# Patient Record
Sex: FEMALE | Race: WHITE | Employment: FULL TIME | URBAN - METROPOLITAN AREA
[De-identification: names, ages, dates, MRNs, and addresses within clinical notes are randomized per-mention and may not be internally consistent; named-entity substitution may affect disease eponyms.]

---

## 2019-04-09 ENCOUNTER — OFFICE VISIT (OUTPATIENT)
Dept: FAMILY MEDICINE CLINIC | Age: 19
End: 2019-04-09

## 2019-04-09 VITALS
HEART RATE: 79 BPM | DIASTOLIC BLOOD PRESSURE: 77 MMHG | BODY MASS INDEX: 22.86 KG/M2 | RESPIRATION RATE: 18 BRPM | OXYGEN SATURATION: 98 % | HEIGHT: 63 IN | WEIGHT: 129 LBS | TEMPERATURE: 98.6 F | SYSTOLIC BLOOD PRESSURE: 116 MMHG

## 2019-04-09 DIAGNOSIS — N93.9 ABNORMAL UTERINE BLEEDING (AUB): Primary | ICD-10-CM

## 2019-04-09 LAB
HCG URINE, QL. (POC): NEGATIVE
VALID INTERNAL CONTROL?: YES

## 2019-04-09 NOTE — PATIENT INSTRUCTIONS
Missed pill instructions for oral contraceptives:  - Missed one pill: either take the pill right as you remember if it is on the same day or take two pills the next day  - Missed two pills: take two pills on the day you remember and take two pills the next day; then take one pill a day until you finish the pack; use protection for the next 7 days if you have intercourse to decrease the risk of pregnancy  - Missed three pills: throw that pill pack away and either wait for withdrawal bleed or start a new pack; use protection for the next 7 days if you have intercourse to decrease the risk of pregnancy    There are some minor side effects you may experience when starting your birth control. These include: weight gain, lighter periods, mood changes, nausea, and sore or swollen breasts. Remember the pneumonic ACHES which identifies some of the less common but more serious side effects of birth control. A - Abdominal pain  C - Chest pain  H - Headache  E - Eye problems (blurred vision)  S - Swelling and or pain in the leg  If you experience any of these side effects after starting your birth control, please call your provider ASAP. Combination Birth Control Pills: Care Instructions  Your Care Instructions    Combination birth control pills are used to prevent pregnancy. They give you a regular dose of the hormones estrogen and progestin. You take a hormone pill every day to prevent pregnancy. Birth control pills come in packs. The most common type has 3 weeks of hormone pills. Some packs have sugar pills (they do not contain any hormones) for the fourth week. During that fourth no-hormone week, you have your period. After the fourth week (28 days), you start a new pack. Some birth control pills are packaged in different ways. For example, some have hormone pills for the fourth week instead of sugar pills. Taking hormones for the entire month causes you to not have periods or to have fewer periods.  Others are packaged so that you have a period every 3 months. Your doctor will tell you what type of pills you have. Follow-up care is a key part of your treatment and safety. Be sure to make and go to all appointments, and call your doctor if you are having problems. It's also a good idea to know your test results and keep a list of the medicines you take. How can you care for yourself at home? How do you take the pill? · Follow your doctor's instructions about when to start taking your pills. Use backup birth control, such as a condom, or don't have intercourse for 7 days after you start your pills. · Take your pills every day, at about the same time of day. To help yourself do this, try to take them when you do something else every day, such as brushing your teeth. What if you forget to take a pill? Always read the label for specific instructions, or call your doctor. Here are some basic guidelines:  · If you miss 1 hormone pill, take it as soon as you remember. Ask your doctor if you may need to use a backup birth control method, such as a condom, or not have intercourse. · If you miss 2 or more hormone pills, take one as soon as you remember you forgot them. Then read the pill label or call your doctor about instructions on how to take your missed pills. Use a backup method of birth control or don't have intercourse for 7 days. Pregnancy is more likely if you miss more than 1 pill. · If you had intercourse, you can use emergency contraception, such as the morning-after pill (Plan B). You can use emergency contraception for up to 5 days after having had intercourse, but it works best if you take it right away. What else do you need to know? · The pill has side effects. ? You may have very light or skipped periods. ? You may have bleeding between periods (spotting). This usually decreases after 3 to 4 months. ? You may have mood changes, less interest in sex, or weight gain.   · The pill may reduce acne, heavy bleeding and cramping, and symptoms of premenstrual syndrome. · Check with your doctor before you use any other medicines, including over-the-counter medicines, vitamins, herbal products, and supplements. Birth control hormones may not work as well to prevent pregnancy when combined with other medicines. · The pill doesn't protect against sexually transmitted infection (STIs), such as herpes or HIV/AIDS. If you're not sure whether your sex partner might have an STI, use a condom to protect against disease. When should you call for help? Call your doctor now or seek immediate medical care if:    · You have severe belly pain.     · You have signs of a blood clot, such as:  ? Pain in your calf, back of the knee, thigh, or groin. ? Redness and swelling in your leg or groin.     · You have blurred vision or other problems seeing.     · You have a severe headache.     · You have severe trouble breathing.    Watch closely for changes in your health, and be sure to contact your doctor if:    · You think you might be pregnant.     · You think you may be depressed.     · You think you may have been exposed to or have a sexually transmitted infection. Where can you learn more? Go to http://rossy-valente.info/. Enter T578 in the search box to learn more about \"Combination Birth Control Pills: Care Instructions. \"  Current as of: September 5, 2018  Content Version: 11.9  © 2846-0548 [employer]. Care instructions adapted under license by [employer] (which disclaims liability or warranty for this information). If you have questions about a medical condition or this instruction, always ask your healthcare professional. [place] [address] any warranty or liability for your use of this information.

## 2019-04-09 NOTE — PROGRESS NOTES
Chief Complaint   Patient presents with   [address] Contraception     Patient in office today to [facility]. Pt would like to discuss starting contraceptive, pt has never been on ocp. Pt would like to discuss ocp.

## 2019-04-09 NOTE — PROGRESS NOTES
Chief Complaint   Patient presents with   [address] Contraception     Patient in office today to [facility]. Was previously a patient at [facility]. Beryle Levee. Currently working and taking online classes. Starting [employer] in the Fall. Wants to study theater. Pt would like to discuss starting contraceptive, pt has never been on ocp. Pt would like to discuss ocp. LMP: started on Saturday  Last intercourse: Friday  Any possibility of STI? Denies. Vaginal or urinary sx: Denies. Has patient previously tried [facility] before: Denies. Current smoker? Denies. Any history of blood clots in legs or lungs? Denies. Any family history of blood clots? Denies. History of migraine HAs? Denies. Usually the first 2 days of her cycle are heavy and painful. Was also having some irregular periods. Chief Complaint   Patient presents with   [address] Contraception     she is a 22y.o. year old female who presents for evalution. Reviewed PmHx, RxHx, FmHx, SocHx, AllgHx and updated and dated in the chart.     Review of Systems - negative except as listed above in the HPI    Objective:     Vitals:    04/09/19 0950   BP: 116/77   Pulse: 79   Resp: 18   Temp: 98.6 °F (37 °C)   TempSrc: Oral   SpO2: 98%   Weight: 129 lb (58.5 kg)   Height: 5' 3\" (1.6 m)     Physical Examination: General appearance - alert, well appearing, and in no distress  Mental status - normal mood, behavior, speech, dress, motor activity, and thought processes  Eyes - pupils equal and reactive, extraocular eye movements intact  Ears - bilateral TM's and external ear canals normal  Nose - normal and patent, no erythema, discharge or polyps  Mouth - mucous membranes moist, pharynx normal without lesions  Neck - supple, no significant adenopathy, thyroid exam: thyroid is normal in size without nodules or tenderness  Chest - clear to auscultation, no wheezes, rales or rhonchi, symmetric air entry  Heart - normal rate, regular rhythm, normal S1, S2, no murmurs\  Extremities - peripheral pulses normal, no edema, no clubbing or cyanosis  Skin - normal coloration and turgor, no rashes, no suspicious skin lesions noted    Assessment/ Plan:   Diagnoses and all orders for this visit:    1. Abnormal uterine bleeding (AUB)  -     AMB POC URINE PREGNANCY TEST, VISUAL COLOR COMPARISON  -     norethindrone-e estradiol-iron (LOESTRIN FE) 1 mg-20 mcg (24)/75 mg (4) tab; Take 1 Tab by mouth daily. Start loestrin daily. Reviewed SEs/ADRs of medication. Reviewed missed pill instructions and ACHES. Follow up if sx persist or worsen or if medication SEs intolerable. Follow-up and Dispositions    · Return if symptoms worsen or fail to improve. I have discussed the diagnosis with the patient and the intended plan as seen in the above orders. The patient has received an after-visit summary and questions were answered concerning future plans. Medication Side Effects and Warnings were discussed with patient: yes  Patient Labs were reviewed and or requested: yes  Patient Past Records were reviewed and or requested  yes  Patient / Caregiver Understanding of treatment plan was verbalized during office visit YES    KIRBY Duran    Patient Instructions     Missed pill instructions for oral contraceptives:  - Missed one pill: either take the pill right as you remember if it is on the same day or take two pills the next day  - Missed two pills: take two pills on the day you remember and take two pills the next day; then take one pill a day until you finish the pack; use protection for the next 7 days if you have intercourse to decrease the risk of pregnancy  - Missed three pills: throw that pill pack away and either wait for withdrawal bleed or start a new pack; use protection for the next 7 days if you have intercourse to decrease the risk of pregnancy    There are some minor side effects you may experience when starting your birth control.  These include: weight gain, lighter periods, mood changes, nausea, and sore or swollen breasts. Remember the pneumonic ACHES which identifies some of the less common but more serious side effects of birth control. A - Abdominal pain  C - Chest pain  H - Headache  E - Eye problems (blurred vision)  S - Swelling and or pain in the leg  If you experience any of these side effects after starting your birth control, please call your provider ASAP. Combination Birth Control Pills: Care Instructions  Your Care Instructions    Combination birth control pills are used to prevent pregnancy. They give you a regular dose of the hormones estrogen and progestin. You take a hormone pill every day to prevent pregnancy. Birth control pills come in packs. The most common type has 3 weeks of hormone pills. Some packs have sugar pills (they do not contain any hormones) for the fourth week. During that fourth no-hormone week, you have your period. After the fourth week (28 days), you start a new pack. Some birth control pills are packaged in different ways. For example, some have hormone pills for the fourth week instead of sugar pills. Taking hormones for the entire month causes you to not have periods or to have fewer periods. Others are packaged so that you have a period every 3 months. Your doctor will tell you what type of pills you have. Follow-up care is a key part of your treatment and safety. Be sure to make and go to all appointments, and call your doctor if you are having problems. It's also a good idea to know your test results and keep a list of the medicines you take. How can you care for yourself at home? How do you take the pill? · Follow your doctor's instructions about when to start taking your pills. Use backup birth control, such as a condom, or don't have intercourse for 7 days after you start your pills. · Take your pills every day, at about the same time of day.  To help yourself do this, try to take them when you do something else every day, such as brushing your teeth. What if you forget to take a pill? Always read the label for specific instructions, or call your doctor. Here are some basic guidelines:  · If you miss 1 hormone pill, take it as soon as you remember. Ask your doctor if you may need to use a backup birth control method, such as a condom, or not have intercourse. · If you miss 2 or more hormone pills, take one as soon as you remember you forgot them. Then read the pill label or call your doctor about instructions on how to take your missed pills. Use a backup method of birth control or don't have intercourse for 7 days. Pregnancy is more likely if you miss more than 1 pill. · If you had intercourse, you can use emergency contraception, such as the morning-after pill (Plan B). You can use emergency contraception for up to 5 days after having had intercourse, but it works best if you take it right away. What else do you need to know? · The pill has side effects. ? You may have very light or skipped periods. ? You may have bleeding between periods (spotting). This usually decreases after 3 to 4 months. ? You may have mood changes, less interest in sex, or weight gain. · The pill may reduce acne, heavy bleeding and cramping, and symptoms of premenstrual syndrome. · Check with your doctor before you use any other medicines, including over-the-counter medicines, vitamins, herbal products, and supplements. Birth control hormones may not work as well to prevent pregnancy when combined with other medicines. · The pill doesn't protect against sexually transmitted infection (STIs), such as herpes or HIV/AIDS. If you're not sure whether your sex partner might have an STI, use a condom to protect against disease. When should you call for help?   Call your doctor now or seek immediate medical care if:    · You have severe belly pain.     · You have signs of a blood clot, such as:  ? Pain in your calf, back of the knee, thigh, or groin. ? Redness and swelling in your leg or groin.     · You have blurred vision or other problems seeing.     · You have a severe headache.     · You have severe trouble breathing.    Watch closely for changes in your health, and be sure to contact your doctor if:    · You think you might be pregnant.     · You think you may be depressed.     · You think you may have been exposed to or have a sexually transmitted infection. Where can you learn more? Go to http://rossy-valente.info/. Enter J920 in the search box to learn more about \"Combination Birth Control Pills: Care Instructions. \"  Current as of: September 5, 2018  Content Version: 11.9  © 3861-4495 [employer]. Care instructions adapted under license by [employer] (which disclaims liability or warranty for this information). If you have questions about a medical condition or this instruction, always ask your healthcare professional. [address] any warranty or liability for your use of this information.

## 2019-10-11 ENCOUNTER — TELEPHONE (OUTPATIENT)
Dept: FAMILY MEDICINE CLINIC | Age: 19
End: 2019-10-11

## 2019-10-11 ENCOUNTER — OFFICE VISIT (OUTPATIENT)
Dept: FAMILY MEDICINE CLINIC | Age: 19
End: 2019-10-11

## 2019-10-11 VITALS
WEIGHT: 137 LBS | SYSTOLIC BLOOD PRESSURE: 122 MMHG | HEART RATE: 87 BPM | HEIGHT: 63 IN | RESPIRATION RATE: 18 BRPM | TEMPERATURE: 99.1 F | OXYGEN SATURATION: 99 % | DIASTOLIC BLOOD PRESSURE: 83 MMHG | BODY MASS INDEX: 24.27 KG/M2

## 2019-10-11 DIAGNOSIS — F41.1 GAD (GENERALIZED ANXIETY DISORDER): Primary | ICD-10-CM

## 2019-10-11 DIAGNOSIS — F32.A DEPRESSIVE DISORDER: ICD-10-CM

## 2019-10-11 DIAGNOSIS — F41.1 GAD (GENERALIZED ANXIETY DISORDER): ICD-10-CM

## 2019-10-11 DIAGNOSIS — Z30.013 ENCOUNTER FOR INITIAL PRESCRIPTION OF INJECTABLE CONTRACEPTIVE: ICD-10-CM

## 2019-10-11 LAB
HCG URINE, QL. (POC): NEGATIVE
VALID INTERNAL CONTROL?: YES

## 2019-10-11 RX ORDER — MEDROXYPROGESTERONE ACETATE 150 MG/ML
150 INJECTION, SUSPENSION INTRAMUSCULAR ONCE
Qty: 1 ML | Refills: 3 | Status: SHIPPED | OUTPATIENT
Start: 2019-10-11 | End: 2019-10-11

## 2019-10-11 RX ORDER — VENLAFAXINE HYDROCHLORIDE 75 MG/1
75 CAPSULE, EXTENDED RELEASE ORAL DAILY
Qty: 30 CAP | Refills: 1 | Status: SHIPPED | OUTPATIENT
Start: 2019-10-11 | End: 2019-10-14 | Stop reason: DRUGHIGH

## 2019-10-11 RX ORDER — VENLAFAXINE HYDROCHLORIDE 75 MG/1
75 CAPSULE, EXTENDED RELEASE ORAL DAILY
Qty: 30 CAP | Refills: 1 | Status: SHIPPED | OUTPATIENT
Start: 2019-10-11 | End: 2019-10-11 | Stop reason: SDUPTHER

## 2019-10-11 RX ORDER — VENLAFAXINE HYDROCHLORIDE 37.5 MG/1
37.5 CAPSULE, EXTENDED RELEASE ORAL DAILY
Qty: 7 CAP | Refills: 0 | Status: SHIPPED | OUTPATIENT
Start: 2019-10-11 | End: 2019-10-14 | Stop reason: DRUGHIGH

## 2019-10-11 RX ORDER — VENLAFAXINE HYDROCHLORIDE 37.5 MG/1
37.5 CAPSULE, EXTENDED RELEASE ORAL DAILY
Qty: 7 CAP | Refills: 0 | Status: SHIPPED | OUTPATIENT
Start: 2019-10-11 | End: 2019-10-11 | Stop reason: SDUPTHER

## 2019-10-11 RX ORDER — MEDROXYPROGESTERONE ACETATE 150 MG/ML
150 INJECTION, SUSPENSION INTRAMUSCULAR ONCE
Qty: 1 ML | Refills: 3 | Status: SHIPPED | OUTPATIENT
Start: 2019-10-11 | End: 2019-10-11 | Stop reason: SDUPTHER

## 2019-10-11 NOTE — PATIENT INSTRUCTIONS
Venlafaxine (By mouth)   Venlafaxine ([id number])  Treats depression, generalized anxiety disorder, panic disorder, and social anxiety disorder. Brand Name(s): Effexor XR   There may be other brand names for this medicine. When This Medicine Should Not Be Used: This medicine is not right for everyone. Do not use it if you had an allergic reaction to venlafaxine or desvenlafaxine succinate. How to Use This Medicine:   Long Acting Capsule, Tablet, Long Acting Tablet  · Take your medicine as directed. Your dose may need to be changed several times to find what works best for you. · It is best to take the extended-release capsule at the same time each day (either in the morning or evening). · It is best to take this medicine with food or milk. · Swallow the extended-release capsule whole. Do not crush, break, or chew it. Do not place the capsule in a liquid. · If you cannot swallow the extended-release capsule, you may open it and pour the medicine into a small amount of soft food such as pudding, yogurt, or applesauce. Stir this mixture well and swallow it without chewing. · This medicine should come with a Medication Guide. Ask your pharmacist for a copy if you do not have one. · Missed dose: Take a dose as soon as you remember. If it is almost time for your next dose, wait until then and take a regular dose. Do not take extra medicine to make up for a missed dose. · Store the medicine in a closed container at room temperature, away from heat, moisture, and direct light. Drugs and Foods to Avoid:   Ask your doctor or pharmacist before using any other medicine, including over-the-counter medicines, vitamins, and herbal products. · Do not use this medicine if you have used an MAO inhibitor within the past 14 days. Do not take an MAO inhibitor for at least 7 days after you stop this medicine. · Some medicines can affect how venlafaxine works.  Tell your doctor if you are using any of the following: ¨ Buspirone, cimetidine, fentanyl, ketoconazole, lithium, metoprolol, mirtazapine, Marcos's wort, tramadol, or tryptophan supplements  ¨ Amphetamines  ¨ Blood thinner (including warfarin)  ¨ Diuretic (water pill)  ¨ Medicine for migraine headaches  ¨ Medicine to lose weight (including phentermine)  ¨ NSAID pain or arthritis medicine (including aspirin, celecoxib, diclofenac, ibuprofen, naproxen)  ¨ Tricyclic antidepressant  · Do not drink alcohol while you are using this medicine. · Tell your doctor if you use anything else that makes you sleepy. Some examples are allergy medicine, narcotic pain medicine, and alcohol. Warnings While Using This Medicine:   · Tell your doctor if you are pregnant or breastfeeding, or if you have kidney disease, liver disease, glaucoma, heart disease, high blood pressure, or thyroid problems. Tell your doctor if you have a history of silvia, seizures, heart attack, or stroke. · This medicine can increase thoughts of suicide. Tell your doctor right away if you start to feel depressed and have thoughts about hurting yourself. · This medicine may cause the following problems:   ¨ Serotonin syndrome (when used with certain medicines)  ¨ Increased cholesterol levels  ¨ Increased blood pressure  ¨ Increased risk of bleeding problems  ¨ Low sodium levels  ¨ Interstitial lung disease and eosinophilic pneumonia  · This medicine may make you dizzy or drowsy. Do not drive or do anything that could be dangerous until you know how this medicine affects you. · Do not stop using this medicine suddenly. Your doctor will need to slowly decrease your dose before you stop it completely. · Tell any doctor or dentist who treats you that you are using this medicine. This medicine may affect certain medical test results. · Your doctor will do lab tests at regular visits to check on the effects of this medicine. Keep all appointments. · Keep all medicine out of the reach of children.  Never share your medicine with anyone. Possible Side Effects While Using This Medicine:   Call your doctor right away if you notice any of these side effects:  · Allergic reaction: Itching or hives, swelling in your face or hands, swelling or tingling in your mouth or throat, chest tightness, trouble breathing  · Anxiety, restlessness, fever, sweating, muscle spasms, nausea, vomiting, diarrhea, seeing or hearing things that are not there  · Blistering, peeling, red skin rash  · Chest pain, cough, trouble breathing  · Confusion, weakness, and muscle twitching  · Eye pain, vision changes, seeing halos around lights  · Fast or pounding heartbeat  · Feeling more excited or energetic than usual  · Headache, trouble concentrating, memory problems, unsteadiness  · Seizures  · Unusual behavior, thoughts of hurting yourself or others, trouble sleeping, nervousness, unusual dreams  · Unusual bleeding or bruising  If you notice these less serious side effects, talk with your doctor:   · Dry mouth  · Mild nausea, constipation, vomiting, loss of appetite, weight loss  · Sexual problems  · Sleepiness or unusual drowsiness, dizziness  If you notice other side effects that you think are caused by this medicine, tell your doctor. Call your doctor for medical advice about side effects. You may report side effects to FDA at 9-570-FDA-9442  © 2017 [facility] Information is for End User's use only and may not be sold, redistributed or otherwise used for commercial purposes. The above information is an  only. It is not intended as medical advice for individual conditions or treatments. Talk to your doctor, nurse or pharmacist before following any medical regimen to see if it is safe and effective for you.

## 2019-10-11 NOTE — PROGRESS NOTES
Chief Complaint   Patient presents with    Anxiety    Depo     Patient in office today to discuss anxiety levels that has worsened in the past 2 months. Pt states she has noted n/v and panic attacks. Pt states she noted 2x wk panic attacks that last 15-30mins. Pt used supportive measures to deal with panic attacks. Pt stated she stopped loestrin fe 8 months ago thinking was contributing to anxiety. Pt would like to discuss initiating the depo injection for contraception. 1. Have you been to the ER, urgent care clinic since your last visit? Hospitalized since your last visit? No    2. Have you seen or consulted any other health care providers outside of the [address] [place] [address] since your last visit? Include any pap smears or colon screening.  No

## 2019-10-11 NOTE — PROGRESS NOTES
Chief Complaint   Patient presents with    Anxiety    Depo     Patient in office today to discuss anxiety levels that has worsened in the past 2 months. Pt states she has noted n/v and panic attacks. Pt states she noted 2x wk panic attacks that last 15-30 mins. Pt used supportive measures to deal with panic attacks. Pt stated she stopped loestrin fe 8 months ago thinking was contributing to anxiety. Pt has not previously been on medication for anxiety and depression. Has previously been able to control. Now effecting her relationships and school. 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 Denies any situational stressors or triggers. Frustrated because she feels like she shouldn't feel this way. Has problems sleeping at night. Will wake up 2 times a night. Hast an appt with a counselor. Pt would like to discuss initiating the depo injection for contraception. Chief Complaint   Patient presents with   [address] Depo     she is a 20y.o. year old female who presents for evalution. Reviewed PmHx, RxHx, FmHx, SocHx, AllgHx and updated and dated in the chart. Review of Systems - negative except as listed above in the HPI    Objective:     Vitals:    10/11/19 0905   BP: 122/83   Pulse: 87   Resp: 18   Temp: 99.1 °F (37.3 °C)   TempSrc: Oral   SpO2: 99%   Weight: 137 lb (62.1 kg)   Height: 5' 3\" (1.6 m)     Physical Examination: General appearance - alert, well appearing, and in no distress  Mental status - depressed mood, anxious  Chest - clear to auscultation, no wheezes, rales or rhonchi, symmetric air entry  Heart - normal rate, regular rhythm, normal S1, S2, no murmurs    Assessment/ Plan:   Diagnoses and all orders for this visit:    1. CRIS (generalized anxiety disorder) / 2. Depressive disorder  Start effexor daily. Reviewed SEs/ADRs of medication. Enc pt to follow up if sx persist or worsen or if medication SEs intolerable to discuss alt treatment options.  Keep appt with the counselor for additional assistance. 3. Encounter for initial prescription of injectable contraceptive  -     AMB POC URINE PREGNANCY TEST, VISUAL COLOR COMPARISON  Neg UPT. Depo sent to pharmacy. RTC for injection. Follow-up and Dispositions    · Return in about 4 weeks (around 11/8/2019). I have discussed the diagnosis with the patient and the intended plan as seen in the above orders. The patient has received an after-visit summary and questions were answered concerning future plans. Medication Side Effects and Warnings were discussed with patient: yes  Patient Labs were reviewed and or requested: yes  Patient Past Records were reviewed and or requested  yes  Patient / Caregiver Understanding of treatment plan was verbalized during office visit YES    KIRBY Talbot    Patient Instructions   Venlafaxine (By mouth)   Venlafaxine ([id number])  Treats depression, generalized anxiety disorder, panic disorder, and social anxiety disorder. Brand Name(s): Effexor XR   There may be other brand names for this medicine. When This Medicine Should Not Be Used: This medicine is not right for everyone. Do not use it if you had an allergic reaction to venlafaxine or desvenlafaxine succinate. How to Use This Medicine:   Long Acting Capsule, Tablet, Long Acting Tablet  · Take your medicine as directed. Your dose may need to be changed several times to find what works best for you. · It is best to take the extended-release capsule at the same time each day (either in the morning or evening). · It is best to take this medicine with food or milk. · Swallow the extended-release capsule whole. Do not crush, break, or chew it. Do not place the capsule in a liquid. · If you cannot swallow the extended-release capsule, you may open it and pour the medicine into a small amount of soft food such as pudding, yogurt, or applesauce. Stir this mixture well and swallow it without chewing.   · This medicine should come with a Medication Guide. Ask your pharmacist for a copy if you do not have one. · Missed dose: Take a dose as soon as you remember. If it is almost time for your next dose, wait until then and take a regular dose. Do not take extra medicine to make up for a missed dose. · Store the medicine in a closed container at room temperature, away from heat, moisture, and direct light. Drugs and Foods to Avoid:   Ask your doctor or pharmacist before using any other medicine, including over-the-counter medicines, vitamins, and herbal products. · Do not use this medicine if you have used an MAO inhibitor within the past 14 days. Do not take an MAO inhibitor for at least 7 days after you stop this medicine. · Some medicines can affect how venlafaxine works. Tell your doctor if you are using any of the following:   ¨ Buspirone, cimetidine, fentanyl, ketoconazole, lithium, metoprolol, mirtazapine, Marcos's wort, tramadol, or tryptophan supplements  ¨ Amphetamines  ¨ Blood thinner (including warfarin)  ¨ Diuretic (water pill)  ¨ Medicine for migraine headaches  ¨ Medicine to lose weight (including phentermine)  ¨ NSAID pain or arthritis medicine (including aspirin, celecoxib, diclofenac, ibuprofen, naproxen)  ¨ Tricyclic antidepressant  · Do not drink alcohol while you are using this medicine. · Tell your doctor if you use anything else that makes you sleepy. Some examples are allergy medicine, narcotic pain medicine, and alcohol. Warnings While Using This Medicine:   · Tell your doctor if you are pregnant or breastfeeding, or if you have kidney disease, liver disease, glaucoma, heart disease, high blood pressure, or thyroid problems. Tell your doctor if you have a history of silvia, seizures, heart attack, or stroke. · This medicine can increase thoughts of suicide. Tell your doctor right away if you start to feel depressed and have thoughts about hurting yourself.   · This medicine may cause the following problems: ¨ Serotonin syndrome (when used with certain medicines)  ¨ Increased cholesterol levels  ¨ Increased blood pressure  ¨ Increased risk of bleeding problems  ¨ Low sodium levels  ¨ Interstitial lung disease and eosinophilic pneumonia  · This medicine may make you dizzy or drowsy. Do not drive or do anything that could be dangerous until you know how this medicine affects you. · Do not stop using this medicine suddenly. Your doctor will need to slowly decrease your dose before you stop it completely. · Tell any doctor or dentist who treats you that you are using this medicine. This medicine may affect certain medical test results. · Your doctor will do lab tests at regular visits to check on the effects of this medicine. Keep all appointments. · Keep all medicine out of the reach of children. Never share your medicine with anyone.   Possible Side Effects While Using This Medicine:   Call your doctor right away if you notice any of these side effects:  · Allergic reaction: Itching or hives, swelling in your face or hands, swelling or tingling in your mouth or throat, chest tightness, trouble breathing  · Anxiety, restlessness, fever, sweating, muscle spasms, nausea, vomiting, diarrhea, seeing or hearing things that are not there  · Blistering, peeling, red skin rash  · Chest pain, cough, trouble breathing  · Confusion, weakness, and muscle twitching  · Eye pain, vision changes, seeing halos around lights  · Fast or pounding heartbeat  · Feeling more excited or energetic than usual  · Headache, trouble concentrating, memory problems, unsteadiness  · Seizures  · Unusual behavior, thoughts of hurting yourself or others, trouble sleeping, nervousness, unusual dreams  · Unusual bleeding or bruising  If you notice these less serious side effects, talk with your doctor:   · Dry mouth  · Mild nausea, constipation, vomiting, loss of appetite, weight loss  · Sexual problems  · Sleepiness or unusual drowsiness, dizziness  If you notice other side effects that you think are caused by this medicine, tell your doctor. Call your doctor for medical advice about side effects. You may report side effects to FDA at 7-810-MSP-1886  © 2017 [facility] Information is for End User's use only and may not be sold, redistributed or otherwise used for commercial purposes. The above information is an  only. It is not intended as medical advice for individual conditions or treatments. Talk to your doctor, nurse or pharmacist before following any medical regimen to see if it is safe and effective for you.

## 2019-10-11 NOTE — TELEPHONE ENCOUNTER
Meds resent. I think they were planning on coming back for Depo today. Please advise that between 12-1:15 we are closed for lunch.

## 2019-10-11 NOTE — TELEPHONE ENCOUNTER
Patient called and states that [facility] does not take her insurance and needs the Depo and Venlafaxine sent to [facility] on [address].

## 2019-10-14 DIAGNOSIS — F32.A DEPRESSIVE DISORDER: ICD-10-CM

## 2019-10-14 DIAGNOSIS — F41.1 GAD (GENERALIZED ANXIETY DISORDER): ICD-10-CM

## 2019-10-14 RX ORDER — VENLAFAXINE HYDROCHLORIDE 75 MG/1
75 CAPSULE, EXTENDED RELEASE ORAL DAILY
Qty: 90 CAP | Refills: 1 | Status: SHIPPED | OUTPATIENT
Start: 2019-10-14 | End: 2020-12-24

## 2019-10-18 ENCOUNTER — OFFICE VISIT (OUTPATIENT)
Dept: FAMILY MEDICINE CLINIC | Age: 19
End: 2019-10-18

## 2019-10-18 DIAGNOSIS — Z30.42 DEPO-PROVERA CONTRACEPTIVE STATUS: Primary | ICD-10-CM

## 2019-10-18 NOTE — PROGRESS NOTES
Chief Complaint   Patient presents with    Depo       Patient in office to receive depo injection in the left deltoid IM given by Celestina Oro LPN. Lot #:[id number]  Exp #:12/2021  [address] [place] [address].  PTD:[id number]    Patient is to return in 1/3/2020-1/17/2020 for next injection.

## 2019-11-05 DIAGNOSIS — F41.1 GAD (GENERALIZED ANXIETY DISORDER): Primary | ICD-10-CM

## 2019-11-05 RX ORDER — LORAZEPAM 0.5 MG/1
0.5 TABLET ORAL
Qty: 30 TAB | Refills: 0 | Status: SHIPPED | OUTPATIENT
Start: 2019-11-05 | End: 2020-12-24 | Stop reason: SDUPTHER

## 2019-11-05 RX ORDER — LORAZEPAM 0.5 MG/1
0.5 TABLET ORAL
Qty: 20 TAB | Refills: 0 | Status: SHIPPED | OUTPATIENT
Start: 2019-11-05 | End: 2019-11-05 | Stop reason: SDUPTHER

## 2020-12-21 ENCOUNTER — TELEPHONE (OUTPATIENT)
Dept: FAMILY MEDICINE CLINIC | Age: 20
End: 2020-12-21

## 2020-12-21 NOTE — TELEPHONE ENCOUNTER
I apologize but an appt is required. Pt has not been seen in over a year and ativan is a controlled substance.

## 2020-12-21 NOTE — TELEPHONE ENCOUNTER
Patient's mom/Louise Gutierrez requesting Lorazepam 0.5 MG to an out of state pharmacy/on file as patient is visiting her dad for the first time in 4 years and needs something to see her through. Ms Park Gutierrez phone: [phone]. Ms Park Gutierrez would like to speak to nurse.

## 2020-12-24 ENCOUNTER — VIRTUAL VISIT (OUTPATIENT)
Dept: FAMILY MEDICINE CLINIC | Age: 20
End: 2020-12-24
Payer: OTHER GOVERNMENT

## 2020-12-24 DIAGNOSIS — N94.6 DYSMENORRHEA: ICD-10-CM

## 2020-12-24 DIAGNOSIS — F41.1 GAD (GENERALIZED ANXIETY DISORDER): Primary | ICD-10-CM

## 2020-12-24 PROCEDURE — 99213 OFFICE O/P EST LOW 20 MIN: CPT | Performed by: NURSE PRACTITIONER

## 2020-12-24 RX ORDER — LORAZEPAM 1 MG/1
1 TABLET ORAL
Qty: 30 TAB | Refills: 0 | Status: SHIPPED | OUTPATIENT
Start: 2020-12-24 | End: 2022-03-29 | Stop reason: ALTCHOICE

## 2020-12-24 NOTE — PATIENT INSTRUCTIONS
There are some minor side effects you may experience when starting your birth control. These include: weight gain, lighter periods, mood changes, nausea, and sore or swollen breasts. Remember the pneumonic ACHES which identifies some of the less common but more serious side effects of birth control. A - Abdominal pain C - Chest pain H - Headache E - Eye problems (blurred vision) S - Swelling and or pain in the leg If you experience any of these side effects after starting your birth control, please call your provider ASAP. Missed pill instructions for oral contraceptives: - Missed one pill: either take the pill right as you remember if it is on the same day or take two pills the next day - Missed two pills: take two pills on the day you remember and take two pills the next day; then take one pill a day until you finish the pack; use protection for the next 7 days if you have intercourse to decrease the risk of pregnancy - Missed three pills: throw that pill pack away and either wait for withdrawal bleed or start a new pack; use protection for the next 7 days if you have intercourse to decrease the risk of pregnancy

## 2020-12-24 NOTE — PROGRESS NOTES
Leti Beard is a 23 y.o. female who was seen by synchronous (real-time) audio-video technology on 12/24/2020 for Anxiety, Medication Evaluation, and Irregular Menses        Assessment & Plan:   Diagnoses and all orders for this visit:    1. CRIS (generalized anxiety disorder)  -     LORazepam (ATIVAN) 1 mg tablet; Take 1 Tab by mouth daily as needed for Anxiety. Has been using medication very sparingly. Will increase to 1 mg daily as needed for acute anxiety sx only. Reinforced SEs/ADRs of medication and how to take responsibly. Continue working with therapist.   2. Dysmenorrhea  -     norethindrone-e estradiol-iron (LOESTRIN FE) 1 mg-20 mcg (24)/75 mg (4) tab; Take 1 Tab by mouth daily. Change back to loestrin FE. Reviewed ACHES, SEs, how to take daily as prescribed. Enc pt to take a UPT prior to resuming to confirm not pregnant and she is amenable to this. I spent at least 15 minutes on this visit with this established patient. 712  Subjective:     Chief Complaint   Patient presents with    Anxiety    Medication Evaluation    Irregular Menses     Patient vv appt today for med check. Pt would like to discuss increasing dosage of ativan. Having less frequent panic attacks, attacks are more manageable. Still feels like when she needs the medication, she really needs it and it helps. Has been working with her therapist regularly. Notes medication is working, only takes prn. Pt states she does note ativan is no longer lasting as it once was when started a yr ago. Pt would like to discuss returning back to ocp instead of depo. COVID made it hard to get the DEPO every 3 months. Denies any chance she could be pregnant. Has not had intercourse in some time. Since stopping depo periods have been heavy and irregular. There are some minor side effects you may experience when starting your birth control.  These include: weight gain, lighter periods, mood changes, nausea, and sore or swollen breasts. LMP: very end of November. Last intercourse: denies recent intercourse. Any possibility of STI? Denies. Vaginal or urinary sx: Denies. Has patient previously tried [facility] before: Denies. Current smoker? Denies. Any history of blood clots in legs or lungs? Denies. Any family history of blood clots? Denies. History of migraine HAs? Denies. Prior to Admission medications    Medication Sig Start Date End Date Taking? Authorizing Provider   LORazepam (ATIVAN) 0.5 mg tablet Take 1 Tab by mouth daily as needed for Anxiety. 11/5/19  Yes Cornell Barros NP   venlafaxine-SR [facility] 75 mg capsule Take 1 Cap by mouth daily. 10/14/19   Cornell Barros NP   norethindrone-e estradiol-iron (LOESTRIN FE) 1 mg-20 mcg (24)/75 mg (4) tab Take 1 Tab by mouth daily. 4/9/19   Cornell Barros NP     Patient Active Problem List    Diagnosis Date Noted    Depo-Provera contraceptive status 10/18/2019     Current Outpatient Medications   Medication Sig Dispense Refill    norethindrone-e estradiol-iron (LOESTRIN FE) 1 mg-20 mcg (24)/75 mg (4) tab Take 1 Tab by mouth daily. 3 Package 0    LORazepam (ATIVAN) 1 mg tablet Take 1 Tab by mouth daily as needed for Anxiety. 30 Tab 0     No Known Allergies    ROS    Objective:   No flowsheet data found. General: alert, cooperative, no distress   Mental  status: normal mood, behavior, speech, dress, motor activity, and thought processes, able to follow commands   HENT: NCAT   Neck: no visualized mass   Resp: no respiratory distress   Neuro: no gross deficits   Skin: no discoloration or lesions of concern on visible areas   Psychiatric: normal affect, consistent with stated mood, no evidence of hallucinations     Additional exam findings: We discussed the expected course, resolution and complications of the diagnosis(es) in detail. Medication risks, benefits, costs, interactions, and alternatives were discussed as indicated.   I advised her to contact the office if her condition worsens, changes or fails to improve as anticipated. She expressed understanding with the diagnosis(es) and plan. Pretty Live, who was evaluated through a patient-initiated, synchronous (real-time) audio-video encounter, and/or her healthcare decision maker, is aware that it is a billable service, with coverage as determined by her insurance carrier. She provided verbal consent to proceed: Yes, and patient identification was verified. It was conducted pursuant to the emergency declaration under the [address] [place] [address], [address] [place] [address] authority and the [email or web address] General Act. A caregiver was present when appropriate. Ability to conduct physical exam was limited. I was at home. The patient was at home.       Merrill Jose NP

## 2020-12-24 NOTE — PROGRESS NOTES
Chief Complaint   Patient presents with    Anxiety    Medication Evaluation    Irregular Menses     Patient vv appt today for med check. Pt would like to discuss increasing dosage of ativan. Notes medication is working,onlt taking prn. Pt states she does note ativan is no longer lasting as it once was when started a yr ago. Pt would like to discuss returning back to ocp instead of depo.

## 2021-03-31 DIAGNOSIS — N94.6 DYSMENORRHEA: Primary | ICD-10-CM

## 2021-03-31 RX ORDER — DESOGESTREL AND ETHINYL ESTRADIOL 0.15-0.03
1 KIT ORAL DAILY
Qty: 1 PACKAGE | Refills: 3 | Status: SHIPPED | OUTPATIENT
Start: 2021-03-31 | End: 2021-06-21 | Stop reason: SDUPTHER

## 2021-06-21 DIAGNOSIS — N94.6 DYSMENORRHEA: ICD-10-CM

## 2021-06-21 RX ORDER — DESOGESTREL AND ETHINYL ESTRADIOL 0.15-0.03
1 KIT ORAL DAILY
Qty: 3 PACKAGE | Refills: 3 | Status: SHIPPED | OUTPATIENT
Start: 2021-06-21 | End: 2021-07-22 | Stop reason: SDUPTHER

## 2022-03-19 PROBLEM — Z30.42 DEPO-PROVERA CONTRACEPTIVE STATUS: Status: ACTIVE | Noted: 2019-10-18

## 2022-03-29 ENCOUNTER — VIRTUAL VISIT (OUTPATIENT)
Dept: FAMILY MEDICINE CLINIC | Age: 22
End: 2022-03-29
Payer: OTHER GOVERNMENT

## 2022-03-29 DIAGNOSIS — F41.1 GAD (GENERALIZED ANXIETY DISORDER): Primary | ICD-10-CM

## 2022-03-29 DIAGNOSIS — L70.0 ACNE VULGARIS: ICD-10-CM

## 2022-03-29 PROCEDURE — 99214 OFFICE O/P EST MOD 30 MIN: CPT | Performed by: NURSE PRACTITIONER

## 2022-03-29 RX ORDER — CLINDAMYCIN AND BENZOYL PEROXIDE 10; 50 MG/G; MG/G
GEL TOPICAL 2 TIMES DAILY
Qty: 50 G | Refills: 2 | Status: SHIPPED | OUTPATIENT
Start: 2022-03-29

## 2022-03-29 RX ORDER — DIAZEPAM 5 MG/1
2.5-5 TABLET ORAL
Qty: 20 TABLET | Refills: 1 | Status: SHIPPED | OUTPATIENT
Start: 2022-03-29

## 2022-03-29 NOTE — LETTER
3/29/2022 10:47 AM    Ms. [address] [id number]      To Whom It May Concern:    Norma Flanagan is currently under the care of [address]. Neil Cranker suffers from generalized anxiety disorder. In order to help alleviate her symptoms of anxiety and to enhance her ability to live independently, it is my recommendation that she have a support animal. The presence of this animal is beneficial to her emotional well being as its presence will mitigate her anxiety symptoms. Please allow Radha to be accompanied by her emotional support dog. If there are questions or concerns please have the patient contact our office.         Sincerely,      Arash Delgado NP

## 2022-03-29 NOTE — PROGRESS NOTES
Norma Flanagan is a 24 y.o. female who was seen by synchronous (real-time) audio-video technology on 3/29/2022 for Anxiety, Medication Evaluation, and Form Completion        Assessment & Plan:   Diagnoses and all orders for this visit:    1. CRIS (generalized anxiety disorder)  -     diazePAM (Valium) 5 mg tablet; Take 0.5-1 Tablets by mouth every six (6) hours as needed for Anxiety. Max Daily Amount: 20 mg.  Recommended pt switch to valium as needed for acute anxiety sx. Reviewed SEs/DRs of medication and how to take responsibly. Using very sparingly lately due to improvement of mood and anxiety with having a dog in her life. ALY letter completed for submission to \[facility]\"". 2. Acne vulgaris  -     clindamycin-benzoyl peroxide (BENZACLIN) 1-5 % topical gel; Apply  to affected area two (2) times a day. Apply to affected area after the skin has been cleansed and dried. Recommended trial of benzaclin. Reviewed SEs/ADRs of medication a nd other supportive measures. Follow up if sx persist or worsen. I spent at least 20 minutes on this visit with this established patient. 712  Subjective:     Chief Complaint   Patient presents with    Anxiety    Medication Evaluation    Form Completion     Patient vv appt today for follow up on anxiety. Pt states she has been doing well with anxiety due to dog. Would like to discuss letter to make pet emotional support . Will be moving into an apartment complex that will require her to have a letter on file supporting the need for a dog. Since having him in her life, less panic attacks. Calmer. Less nightmares as he sleeps with her. Pt will be traveling via plane and would like to discuss restarting ativan for trip. Flying this weekend. Has been out of medication for some time, has not required use of it as often now that she has a dog for stress relief.    States that her only  Issue with the ativan is that it doesn't kick in quickly when she is having more acute anxiety sx. Will try changing to valium. Has had more acne recently. Pt stopped her OCP. Was having problems with side effects. Since stopping that her skin has worsened. Denies any other concerns at this time. In school, 15 credits this semester. Also working. Prior to Admission medications    Medication Sig Start Date End Date Taking? Authorizing Provider   LORazepam (ATIVAN) 1 mg tablet Take 1 Tab by mouth daily as needed for Anxiety. 12/24/20  Yes Tony Michael NP   desogestreL-ethinyl estradioL (DESOGEN) 0.15-0.03 mg tab Take 1 Tablet by mouth daily. Patient not taking: Reported on 3/29/2022 7/22/21   Tony Michael NP     Patient Active Problem List    Diagnosis Date Noted    Depo-Provera contraceptive status 10/18/2019     Current Outpatient Medications   Medication Sig Dispense Refill    diazePAM (Valium) 5 mg tablet Take 0.5-1 Tablets by mouth every six (6) hours as needed for Anxiety. Max Daily Amount: 20 mg. 20 Tablet 1    clindamycin-benzoyl peroxide (BENZACLIN) 1-5 % topical gel Apply  to affected area two (2) times a day. Apply to affected area after the skin has been cleansed and dried. 50 g 2     No Known Allergies    ROS    Objective:     Patient-Reported Vitals 3/29/2022   Patient-Reported Weight 133   Patient-Reported Height 5'2   Patient-Reported Pulse 105   Patient-Reported Temperature 99   Patient-Reported Systolic  165   Patient-Reported Diastolic 67   Patient-Reported LMP 3/12/2022      General: alert, cooperative, no distress   Mental  status: normal mood, behavior, speech, dress, motor activity, and thought processes, able to follow commands   HENT: NCAT   Neck: no visualized mass   Resp: no respiratory distress       Skin: Acne vulgaris on face elba jaw line         Additional exam findings: We discussed the expected course, resolution and complications of the diagnosis(es) in detail.   Medication risks, benefits, costs, interactions, and alternatives were discussed as indicated. I advised her to contact the office if her condition worsens, changes or fails to improve as anticipated. She expressed understanding with the diagnosis(es) and plan. Claudy Gloverchrissy, was evaluated through a synchronous (real-time) audio-video encounter. The patient (or guardian if applicable) is aware that this is a billable service, which includes applicable co-pays. Verbal consent to proceed has been obtained. The visit was conducted pursuant to the emergency declaration under the [address] [place] [address], [address] [place] [address] authority and the [employer] Act. Patient identification was verified, and a caregiver was present when appropriate. The patient was located at home in a state where the provider was licensed to provide care.     Canelo Oneill NP

## 2022-03-29 NOTE — PROGRESS NOTES
Chief Complaint   Patient presents with    Anxiety    Medication Evaluation    Form Completion     Patient vv appt today for follow up on anxiety. Pt states she has been doing well with anxiety due to dog. Would like to discuss letter to make pet emotional support . Pt will be traveling via plane and would like to discuss restarting ativan for trip. 1. Have you been to the ER, urgent care clinic since your last visit? Hospitalized since your last visit? No    2. Have you seen or consulted any other health care providers outside of the [address] [place] [address] since your last visit? Include any pap smears or colon screening.  No

## 2022-06-06 ENCOUNTER — DOCUMENTATION ONLY (OUTPATIENT)
Dept: FAMILY MEDICINE CLINIC | Age: 22
End: 2022-06-06

## 2022-06-10 ENCOUNTER — DOCUMENTATION ONLY (OUTPATIENT)
Dept: FAMILY MEDICINE CLINIC | Age: 22
End: 2022-06-10

## 2022-06-10 NOTE — PROGRESS NOTES
Faxed Verification of Letter form to [phone]. Confirmation number [id number]. Original form placed in scan folder for central scanning.

## 2022-06-12 DIAGNOSIS — L70.0 ACNE VULGARIS: Primary | ICD-10-CM

## 2023-05-17 RX ORDER — CLINDAMYCIN AND BENZOYL PEROXIDE 10; 50 MG/G; MG/G
GEL TOPICAL 2 TIMES DAILY
COMMUNITY
Start: 2022-03-29

## 2023-05-17 RX ORDER — DIAZEPAM 5 MG/1
2.5-5 TABLET ORAL EVERY 6 HOURS PRN
COMMUNITY
Start: 2022-03-29

## 2024-08-27 ENCOUNTER — HOSPITAL ENCOUNTER (EMERGENCY)
Facility: HOSPITAL | Age: 24
Discharge: HOME | End: 2024-08-28
Attending: EMERGENCY MEDICINE
Payer: OTHER GOVERNMENT

## 2024-08-27 VITALS
TEMPERATURE: 98 F | HEART RATE: 95 BPM | DIASTOLIC BLOOD PRESSURE: 74 MMHG | HEIGHT: 62 IN | SYSTOLIC BLOOD PRESSURE: 135 MMHG | OXYGEN SATURATION: 99 % | BODY MASS INDEX: 29.44 KG/M2 | RESPIRATION RATE: 16 BRPM | WEIGHT: 160 LBS

## 2024-08-27 DIAGNOSIS — N75.1 BARTHOLIN'S GLAND ABSCESS: Primary | ICD-10-CM

## 2024-08-27 PROCEDURE — 99283 EMERGENCY DEPT VISIT LOW MDM: CPT

## 2024-08-27 PROCEDURE — 6370000000 HC RX 637 (ALT 250 FOR IP): Performed by: EMERGENCY MEDICINE

## 2024-08-27 PROCEDURE — 56420 I&D BARTHOLINS GLAND ABSCESS: CPT

## 2024-08-27 RX ORDER — ACETAMINOPHEN 500 MG
1000 TABLET ORAL 3 TIMES DAILY
Qty: 120 TABLET | Refills: 3 | Status: SHIPPED | OUTPATIENT
Start: 2024-08-27

## 2024-08-27 RX ORDER — CLINDAMYCIN HCL 300 MG
300 CAPSULE ORAL 3 TIMES DAILY
Qty: 21 CAPSULE | Refills: 0 | Status: SHIPPED | OUTPATIENT
Start: 2024-08-27 | End: 2024-09-03

## 2024-08-27 RX ORDER — CLINDAMYCIN HCL 150 MG
300 CAPSULE ORAL
Status: COMPLETED | OUTPATIENT
Start: 2024-08-27 | End: 2024-08-27

## 2024-08-27 RX ORDER — IBUPROFEN 600 MG/1
600 TABLET, FILM COATED ORAL
Status: COMPLETED | OUTPATIENT
Start: 2024-08-27 | End: 2024-08-27

## 2024-08-27 RX ORDER — IBUPROFEN 800 MG/1
800 TABLET, FILM COATED ORAL EVERY 6 HOURS PRN
Qty: 30 TABLET | Refills: 1 | Status: SHIPPED | OUTPATIENT
Start: 2024-08-27

## 2024-08-27 RX ORDER — OXYCODONE AND ACETAMINOPHEN 5; 325 MG/1; MG/1
1 TABLET ORAL ONCE
Status: COMPLETED | OUTPATIENT
Start: 2024-08-27 | End: 2024-08-27

## 2024-08-27 RX ADMIN — IBUPROFEN 600 MG: 600 TABLET, FILM COATED ORAL at 23:33

## 2024-08-27 RX ADMIN — CLINDAMYCIN HYDROCHLORIDE 300 MG: 150 CAPSULE ORAL at 23:33

## 2024-08-27 RX ADMIN — OXYCODONE HYDROCHLORIDE AND ACETAMINOPHEN 1 TABLET: 5; 325 TABLET ORAL at 23:33

## 2024-08-27 ASSESSMENT — LIFESTYLE VARIABLES
HOW MANY STANDARD DRINKS CONTAINING ALCOHOL DO YOU HAVE ON A TYPICAL DAY: PATIENT DOES NOT DRINK
HOW OFTEN DO YOU HAVE A DRINK CONTAINING ALCOHOL: NEVER

## 2024-08-27 ASSESSMENT — PAIN - FUNCTIONAL ASSESSMENT: PAIN_FUNCTIONAL_ASSESSMENT: 0-10

## 2024-08-27 ASSESSMENT — PAIN DESCRIPTION - LOCATION: LOCATION: VAGINA

## 2024-08-27 ASSESSMENT — PAIN SCALES - GENERAL: PAINLEVEL_OUTOF10: 8

## 2024-08-28 PROCEDURE — 56420 I&D BARTHOLINS GLAND ABSCESS: CPT

## 2024-08-28 RX ORDER — OXYCODONE HYDROCHLORIDE 5 MG/1
5 TABLET ORAL EVERY 4 HOURS PRN
Qty: 10 TABLET | Refills: 0 | Status: SHIPPED | OUTPATIENT
Start: 2024-08-28 | End: 2024-08-31

## 2024-08-28 NOTE — ED TRIAGE NOTES
Pt c/o \"cyst\" on the vaginal area for the past couple days. States the pain is sharp. Denies drainage from the site.    Pt appears uncomfortable. Skin warm and dry. Resp wnl to obs. Neuro intact

## 2024-08-28 NOTE — ED PROVIDER NOTES
[facility] [place] EMERGENCY DEPT  EMERGENCY DEPARTMENT ENCOUNTER      Pt Name: Leeann Gustafson  MRN: [id number]  Birthdate 2000  Date of evaluation: 8/27/2024  Provider: Maurizio Canales MD    CHIEF COMPLAINT       Chief Complaint   Patient presents with    Vaginal Pain       EMERGENCY DEPARTMENT COURSE and DIFFERENTIAL DIAGNOSIS/MDM:   Medical Decision Making  24-year-old female presenting ER with report of vaginal pain and swelling.  Reports cyst present.  Reports the pain has been worsening over the last couple days while waiting to be seen pain escalated to the point and Cedeño ruptured having purulent bloody drainage.  No abdominal pain no fevers or chills.  No history of this previously.  No allergy to antibiotics.  On exam patient does have Bartholin's gland abscess presents with drainage of purulent bloody material.  Area was cleaned and warm catheter was placed.  Will start patient on antibiotics and discussed pain management.  Discussed follow-up in 2 days to have your catheter removed.  Discussed symptomatic treatment.  Sitz bath's.  Patient stable for discharge    Risk  OTC drugs.  Prescription drug management.            REASSESSMENT          HISTORY OF PRESENT ILLNESS      24-year-old female presenting ER with report of vaginal pain and swelling.         Nursing Notes were reviewed.    REVIEW OF SYSTEMS       Review of Systems      PAST MEDICAL HISTORY   No past medical history on file.      SURGICAL HISTORY     No past surgical history on file.      CURRENT MEDICATIONS       Discharge Medication List as of 8/28/2024 12:11 AM        CONTINUE these medications which have NOT CHANGED    Details   clindamycin-benzoyl peroxide (BENZACLIN) 1-5 % gel Apply topically 2 times daily, Topical, 2 TIMES DAILY Starting Tue 3/29/2022, Historical Med      diazePAM (VALIUM) 5 MG tablet Take 0.5-1 tablets by mouth every 6 hours as needed. Max Daily Amount: 20 mgHistorical Med             ALLERGIES     Patient has no known

## 2024-08-30 ENCOUNTER — HOSPITAL ENCOUNTER (EMERGENCY)
Facility: HOSPITAL | Age: 24
Discharge: HOME | End: 2024-08-30
Attending: EMERGENCY MEDICINE
Payer: OTHER GOVERNMENT

## 2024-08-30 VITALS
DIASTOLIC BLOOD PRESSURE: 78 MMHG | WEIGHT: 160 LBS | HEIGHT: 62 IN | TEMPERATURE: 99 F | OXYGEN SATURATION: 99 % | BODY MASS INDEX: 29.44 KG/M2 | RESPIRATION RATE: 18 BRPM | HEART RATE: 74 BPM | SYSTOLIC BLOOD PRESSURE: 113 MMHG

## 2024-08-30 DIAGNOSIS — N75.1 ABSCESS OF BARTHOLIN'S GLAND: Primary | ICD-10-CM

## 2024-08-30 PROCEDURE — 99282 EMERGENCY DEPT VISIT SF MDM: CPT

## 2024-08-30 ASSESSMENT — PAIN - FUNCTIONAL ASSESSMENT: PAIN_FUNCTIONAL_ASSESSMENT: 0-10

## 2024-08-30 NOTE — DISCHARGE INSTRUCTIONS
Complete your course of antibiotics.  Keep your upcoming appointment with your OB/GYN as well.  Return with any new or worsening symptoms

## 2024-08-30 NOTE — ED PROVIDER NOTES
[facility] [place]  EMERGENCY DEPARTMENT ENCOUNTER      Pt Name: Leeann Gustafson  MRN: [id number]  Birthdate 2000  Date of evaluation: 8/30/2024  Provider: Chance Singleton MD      HISTORY OF PRESENT ILLNESS      [facility]  Patient presenting for removal of Word catheter.  Seen in the emergency department 3 days ago for a Bartholin's gland abscess that had ruptured.  Word catheter was placed.  She was started on clindamycin.  Symptoms are improving.  Unable to get OB/GYN follow-up so presented today for catheter removal.  No complaints otherwise.      Nursing Notes were reviewed.    REVIEW OF SYSTEMS         Review of Systems  All systems reviewed were negative less otherwise document in the [facility]      PAST MEDICAL HISTORY   No past medical history on file.      SURGICAL HISTORY     No past surgical history on file.      CURRENT MEDICATIONS       Previous Medications    ACETAMINOPHEN (TYLENOL) 500 MG TABLET    Take 2 tablets by mouth in the morning, at noon, and at bedtime    CLINDAMYCIN (CLEOCIN) 300 MG CAPSULE    Take 1 capsule by mouth 3 times daily for 7 days    CLINDAMYCIN-BENZOYL PEROXIDE (BENZACLIN) 1-5 % GEL    Apply topically 2 times daily    DIAZEPAM (VALIUM) 5 MG TABLET    Take 0.5-1 tablets by mouth every 6 hours as needed. Max Daily Amount: 20 mg    IBUPROFEN (ADVIL;MOTRIN) 800 MG TABLET    Take 1 tablet by mouth every 6 hours as needed for Pain    OXYCODONE (ROXICODONE) 5 MG IMMEDIATE RELEASE TABLET    Take 1 tablet by mouth every 4 hours as needed for Pain for up to 3 days. Max Daily Amount: 30 mg       ALLERGIES     Patient has no known allergies.    FAMILY HISTORY       Family History   Problem Relation Age of Onset    No Known Problems Father     No Known Problems Mother           SOCIAL HISTORY       Social History     Socioeconomic History    Marital status: Single   Tobacco Use    Smoking status: Never    Smokeless tobacco: Never   Substance and Sexual Activity    Alcohol use: Never    Drug use: